# Patient Record
Sex: FEMALE | Race: BLACK OR AFRICAN AMERICAN | NOT HISPANIC OR LATINO | URBAN - METROPOLITAN AREA
[De-identification: names, ages, dates, MRNs, and addresses within clinical notes are randomized per-mention and may not be internally consistent; named-entity substitution may affect disease eponyms.]

---

## 2018-03-20 PROBLEM — 59621000 ESSENTIAL HYPERTENSION: Status: ACTIVE | Noted: 2018-03-20

## 2020-03-17 PROBLEM — 79922009 EPIGASTRIC PAIN: Status: ACTIVE | Noted: 2020-03-17

## 2020-06-17 ENCOUNTER — OFFICE VISIT (OUTPATIENT)
Dept: URBAN - METROPOLITAN AREA SURGERY CENTER 7 | Facility: SURGERY CENTER | Age: 70
End: 2020-06-17

## 2020-06-18 ENCOUNTER — OFFICE VISIT (OUTPATIENT)
Dept: URBAN - METROPOLITAN AREA SURGERY CENTER 7 | Facility: SURGERY CENTER | Age: 70
End: 2020-06-18

## 2022-04-30 ENCOUNTER — TELEPHONE ENCOUNTER (OUTPATIENT)
Dept: URBAN - METROPOLITAN AREA CLINIC 121 | Facility: CLINIC | Age: 72
End: 2022-04-30

## 2022-04-30 RX ORDER — B-COMPLEX WITH VITAMIN C
TABLET ORAL
OUTPATIENT
Start: 2007-08-08

## 2022-04-30 RX ORDER — ECHINACEA PURPUREA AERIAL 350 MG
DOSAGE UNKNOWN CAPSULE ORAL
OUTPATIENT
Start: 2007-08-08 | End: 2015-10-01

## 2022-04-30 RX ORDER — ENALAPRIL MALEATE 5 MG/1
TABLET ORAL
OUTPATIENT
Start: 2007-08-08 | End: 2015-10-01

## 2022-04-30 RX ORDER — B-COMPLEX WITH VITAMIN C
TABLET ORAL
OUTPATIENT
Start: 2007-08-08 | End: 2015-10-01

## 2022-04-30 RX ORDER — ECHINACEA PURPUREA AERIAL 350 MG
DOSAGE UNKNOWN CAPSULE ORAL
OUTPATIENT
Start: 2007-08-08

## 2022-04-30 RX ORDER — ENALAPRIL MALEATE 5 MG/1
TABLET ORAL
OUTPATIENT
Start: 2007-08-08

## 2022-05-01 ENCOUNTER — TELEPHONE ENCOUNTER (OUTPATIENT)
Dept: URBAN - METROPOLITAN AREA CLINIC 121 | Facility: CLINIC | Age: 72
End: 2022-05-01

## 2022-05-01 RX ORDER — ELECTROLYTES/DEXTROSE
SOLUTION, ORAL ORAL
Status: ACTIVE | COMMUNITY
Start: 2015-10-20

## 2022-05-01 RX ORDER — RIBOFLAVIN (VITAMIN B2) 100 MG
DOSAGE UNKNOWN TABLET ORAL
Status: ACTIVE | COMMUNITY
Start: 2007-08-08

## 2022-05-01 RX ORDER — CHROMIUM 200 MCG
TABLET ORAL
Status: ACTIVE | COMMUNITY
Start: 2015-10-20

## 2022-05-01 RX ORDER — SODIUM SULFATE, POTASSIUM SULFATE, MAGNESIUM SULFATE 17.5; 3.13; 1.6 G/ML; G/ML; G/ML
MIX AND USE AS DIRECTED SOLUTION, CONCENTRATE ORAL
Status: ACTIVE | COMMUNITY
Start: 2018-03-20

## 2022-05-01 RX ORDER — PANTOPRAZOLE SODIUM 40 MG/1
TAKE 1 TABLET PO Q AM TABLET, DELAYED RELEASE ORAL
Status: ACTIVE | COMMUNITY
Start: 2020-03-17

## 2022-05-01 RX ORDER — LISINOPRIL AND HYDROCHLOROTHIAZIDE TABLETS 10; 12.5 MG/1; MG/1
QD TABLET ORAL
Status: ACTIVE | COMMUNITY
Start: 2015-10-01

## 2022-10-04 ENCOUNTER — OFFICE VISIT (OUTPATIENT)
Dept: URBAN - METROPOLITAN AREA CLINIC 44 | Facility: CLINIC | Age: 72
End: 2022-10-04
Payer: MEDICARE

## 2022-10-04 VITALS
HEIGHT: 63 IN | SYSTOLIC BLOOD PRESSURE: 138 MMHG | HEART RATE: 71 BPM | BODY MASS INDEX: 29.62 KG/M2 | TEMPERATURE: 97.7 F | DIASTOLIC BLOOD PRESSURE: 67 MMHG | WEIGHT: 167.2 LBS

## 2022-10-04 DIAGNOSIS — R10.2 PELVIC PAIN: ICD-10-CM

## 2022-10-04 PROCEDURE — 99204 OFFICE O/P NEW MOD 45 MIN: CPT | Performed by: INTERNAL MEDICINE

## 2022-10-04 RX ORDER — LOSARTAN POTASSIUM 50 MG/1
TAKE ONE TABLET BY MOUTH ONE TIME DAILY TABLET, FILM COATED ORAL
Qty: 90 UNSPECIFIED | Refills: 1 | Status: ACTIVE | COMMUNITY

## 2022-10-04 RX ORDER — DICYCLOMINE HYDROCHLORIDE 10 MG/1
TAKE ONE CAPSULE BY MOUTH TWICE A DAY CAPSULE ORAL
Qty: 60 UNSPECIFIED | Refills: 0 | Status: ACTIVE | COMMUNITY

## 2022-10-04 RX ORDER — CLONIDINE HYDROCHLORIDE 0.2 MG/1
TAKE ONE TABLET BY MOUTH TWICE A DAY FOR 90 DAYS TABLET ORAL
Qty: 180 UNSPECIFIED | Refills: 0 | Status: ACTIVE | COMMUNITY

## 2022-10-04 RX ORDER — AMLODIPINE BESYLATE 10 MG/1
TAKE ONE TABLET BY MOUTH ONE TIME DAILY TABLET ORAL
Qty: 90 UNSPECIFIED | Refills: 0 | Status: ACTIVE | COMMUNITY

## 2022-10-04 RX ORDER — TRAMADOL HYDROCHLORIDE 50 MG/1
TAKE ONE TABLET BY MOUTH TWICE A DAY TABLET ORAL
Qty: 60 UNSPECIFIED | Refills: 0 | Status: ACTIVE | COMMUNITY

## 2022-10-04 RX ORDER — GABAPENTIN 100 MG/1
TAKE ONE CAPSULE BY MOUTH AT BEDTIME AS NEEDED CAPSULE ORAL
Qty: 90 UNSPECIFIED | Refills: 0 | Status: ACTIVE | COMMUNITY

## 2022-10-04 RX ORDER — PANTOPRAZOLE SODIUM 40 MG/1
TABLET, DELAYED RELEASE ORAL
Qty: 90 TABLET | Status: ACTIVE | COMMUNITY

## 2022-10-04 NOTE — HPI-TODAY'S VISIT:
Pt with HCV genotype 1a s/p Harvoni @ 2016 and achieved SVR (treated at [place] [facility]). Pt since has had repeat viral loads and last in 2020 undectable. Pt at average risk for CRC and last colonoscopy in 2018 at [place] with diveticulosis. Seen in again in 2020 for abdominal pain despite lab quin and EGD wnl and H pylori neg.   Pt now presents with lower pelvic pressure for 2 weeks; intermittent cramping; several times a day; no fevers or chills; no dysuria; no constipation or diarrhea.

## 2023-01-09 ENCOUNTER — DASHBOARD ENCOUNTERS (OUTPATIENT)
Age: 73
End: 2023-01-09

## 2023-01-10 ENCOUNTER — OFFICE VISIT (OUTPATIENT)
Dept: URBAN - METROPOLITAN AREA CLINIC 44 | Facility: CLINIC | Age: 73
End: 2023-01-10
Payer: MEDICARE

## 2023-01-10 VITALS
BODY MASS INDEX: 29.2 KG/M2 | HEART RATE: 63 BPM | WEIGHT: 164.8 LBS | HEIGHT: 63 IN | TEMPERATURE: 97.5 F | DIASTOLIC BLOOD PRESSURE: 61 MMHG | SYSTOLIC BLOOD PRESSURE: 122 MMHG

## 2023-01-10 DIAGNOSIS — K21.9 CHRONIC GERD: ICD-10-CM

## 2023-01-10 DIAGNOSIS — K59.01 SLOW TRANSIT CONSTIPATION: ICD-10-CM

## 2023-01-10 DIAGNOSIS — R10.2 PELVIC PAIN: ICD-10-CM

## 2023-01-10 PROBLEM — 35298007: Status: ACTIVE | Noted: 2023-01-10

## 2023-01-10 PROBLEM — 235595009: Status: ACTIVE | Noted: 2023-01-10

## 2023-01-10 PROCEDURE — 99214 OFFICE O/P EST MOD 30 MIN: CPT | Performed by: INTERNAL MEDICINE

## 2023-01-10 RX ORDER — PANTOPRAZOLE SODIUM 40 MG/1
TABLET, DELAYED RELEASE ORAL
OUTPATIENT

## 2023-01-10 RX ORDER — MULTIVIT-MIN/IRON/FOLIC ACID/K 18-600-40
AS DIRECTED CAPSULE ORAL
Status: ACTIVE | COMMUNITY

## 2023-01-10 RX ORDER — CHOLECALCIFEROL (VITAMIN D3) 50 MCG
1 TABLET TABLET ORAL ONCE A DAY
Status: ACTIVE | COMMUNITY

## 2023-01-10 RX ORDER — PANTOPRAZOLE SODIUM 40 MG/1
TABLET, DELAYED RELEASE ORAL
Qty: 90 TABLET | Status: ACTIVE | COMMUNITY

## 2023-01-10 RX ORDER — CLONIDINE HYDROCHLORIDE 0.2 MG/1
TAKE ONE TABLET BY MOUTH TWICE A DAY FOR 90 DAYS TABLET ORAL
Qty: 180 UNSPECIFIED | Refills: 0 | Status: ACTIVE | COMMUNITY

## 2023-01-10 RX ORDER — AMLODIPINE BESYLATE 10 MG/1
TAKE ONE TABLET BY MOUTH ONE TIME DAILY TABLET ORAL
Qty: 90 UNSPECIFIED | Refills: 0 | Status: ACTIVE | COMMUNITY

## 2023-01-10 RX ORDER — LOSARTAN POTASSIUM 50 MG/1
TAKE ONE TABLET BY MOUTH ONE TIME DAILY TABLET, FILM COATED ORAL
Qty: 90 UNSPECIFIED | Refills: 1 | Status: ACTIVE | COMMUNITY

## 2023-01-10 NOTE — HPI-TODAY'S VISIT:
Pt with HCV genotype 1a s/p Harvoni @ 2016 and achieved SVR (treated at [place] [facility]). Pt since has had repeat viral loads and last in 2020 undectable. Pt at average risk for CRC and last colonoscopy in 2018 at [place] with diveticulosis. Seen in again in 2020 for abdominal pain despite lab quin and EGD wnl and H pylori neg.   10/2022: Pt now presents with lower pelvic pressure for 2 weeks; intermittent cramping; several times a day; no fevers or chills; no dysuria; no constipation or diarrhea. Empiric abx and anti-spasmodics offered but pt deferred. Serology and UA ordered and completed in [place] and reccords requested. 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 Lower pelvic pain better and no new complaints. No change in bowels. No bleeding. No dyspepsia. GERD controlled on PPI.